# Patient Record
Sex: MALE | URBAN - METROPOLITAN AREA
[De-identification: names, ages, dates, MRNs, and addresses within clinical notes are randomized per-mention and may not be internally consistent; named-entity substitution may affect disease eponyms.]

---

## 2017-06-27 ENCOUNTER — APPOINTMENT (RX ONLY)
Dept: URBAN - METROPOLITAN AREA CLINIC 128 | Facility: CLINIC | Age: 57
Setting detail: DERMATOLOGY
End: 2017-06-27

## 2017-06-27 DIAGNOSIS — B35.1 TINEA UNGUIUM: ICD-10-CM

## 2017-06-27 DIAGNOSIS — B35.4 TINEA CORPORIS: ICD-10-CM

## 2017-06-27 PROCEDURE — ? COUNSELING

## 2017-06-27 PROCEDURE — 99202 OFFICE O/P NEW SF 15 MIN: CPT

## 2017-06-27 PROCEDURE — ? PRESCRIPTION

## 2017-06-27 RX ORDER — KETOCONAZOLE 20 MG/G
CREAM TOPICAL
Qty: 55 | Refills: 10 | COMMUNITY
Start: 2017-06-27

## 2017-06-27 RX ADMIN — KETOCONAZOLE: 20 CREAM TOPICAL at 20:45

## 2017-06-27 ASSESSMENT — LOCATION ZONE DERM
LOCATION ZONE: TOENAIL
LOCATION ZONE: TOE

## 2017-06-27 ASSESSMENT — LOCATION SIMPLE DESCRIPTION DERM
LOCATION SIMPLE: LEFT GREAT TOE
LOCATION SIMPLE: LEFT 2ND TOE
LOCATION SIMPLE: RIGHT GREAT TOE
LOCATION SIMPLE: RIGHT 2ND TOE

## 2017-06-27 ASSESSMENT — LOCATION DETAILED DESCRIPTION DERM
LOCATION DETAILED: RIGHT DISTAL PLANTAR 2ND TOE
LOCATION DETAILED: LEFT 2ND TOENAIL
LOCATION DETAILED: RIGHT GREAT TOENAIL
LOCATION DETAILED: LEFT GREAT TOENAIL

## 2018-07-31 ENCOUNTER — APPOINTMENT (RX ONLY)
Dept: URBAN - METROPOLITAN AREA CLINIC 128 | Facility: CLINIC | Age: 58
Setting detail: DERMATOLOGY
End: 2018-07-31

## 2018-07-31 DIAGNOSIS — B35.4 TINEA CORPORIS: ICD-10-CM | Status: IMPROVED

## 2018-07-31 PROBLEM — I10 ESSENTIAL (PRIMARY) HYPERTENSION: Status: ACTIVE | Noted: 2018-07-31

## 2018-07-31 PROCEDURE — 99212 OFFICE O/P EST SF 10 MIN: CPT

## 2018-07-31 PROCEDURE — ? COUNSELING

## 2018-07-31 ASSESSMENT — LOCATION ZONE DERM: LOCATION ZONE: TRUNK

## 2018-07-31 ASSESSMENT — LOCATION SIMPLE DESCRIPTION DERM: LOCATION SIMPLE: UPPER BACK

## 2018-07-31 ASSESSMENT — LOCATION DETAILED DESCRIPTION DERM: LOCATION DETAILED: INFERIOR THORACIC SPINE

## 2018-07-31 NOTE — PROCEDURE: COUNSELING
Detail Level: Detailed
Patient Specific Counseling (Will Not Stick From Patient To Patient): Continue on ketaconozole cream advise to use up three times a day.

## 2018-08-22 ENCOUNTER — RX ONLY (OUTPATIENT)
Age: 58
Setting detail: RX ONLY
End: 2018-08-22

## 2018-08-22 RX ORDER — KETOCONAZOLE 20 MG/G
CREAM TOPICAL
Qty: 55 | Refills: 10